# Patient Record
Sex: FEMALE | Race: WHITE | Employment: PART TIME | URBAN - METROPOLITAN AREA
[De-identification: names, ages, dates, MRNs, and addresses within clinical notes are randomized per-mention and may not be internally consistent; named-entity substitution may affect disease eponyms.]

---

## 2017-09-18 ENCOUNTER — HOSPITAL ENCOUNTER (EMERGENCY)
Age: 37
Discharge: HOME | End: 2017-09-18
Attending: EMERGENCY MEDICINE
Payer: COMMERCIAL

## 2017-09-18 VITALS
WEIGHT: 293 LBS | HEIGHT: 68 IN | TEMPERATURE: 98.4 F | BODY MASS INDEX: 44.41 KG/M2 | DIASTOLIC BLOOD PRESSURE: 88 MMHG | RESPIRATION RATE: 16 BRPM | HEART RATE: 91 BPM | OXYGEN SATURATION: 100 % | SYSTOLIC BLOOD PRESSURE: 147 MMHG

## 2017-09-18 DIAGNOSIS — I10 ESSENTIAL HYPERTENSION: ICD-10-CM

## 2017-09-18 DIAGNOSIS — R51.9 NONINTRACTABLE HEADACHE, UNSPECIFIED CHRONICITY PATTERN, UNSPECIFIED HEADACHE TYPE: Primary | ICD-10-CM

## 2017-09-18 LAB
ANION GAP SERPL CALC-SCNC: 8 MMOL/L (ref 3–18)
BASOPHILS # BLD: 0 K/UL (ref 0–0.1)
BASOPHILS NFR BLD: 0 % (ref 0–3)
BUN SERPL-MCNC: 5 MG/DL (ref 7–18)
BUN/CREAT SERPL: 7 (ref 12–20)
CALCIUM SERPL-MCNC: 8.4 MG/DL (ref 8.5–10.1)
CHLORIDE SERPL-SCNC: 100 MMOL/L (ref 100–108)
CO2 SERPL-SCNC: 29 MMOL/L (ref 21–32)
CREAT SERPL-MCNC: 0.71 MG/DL (ref 0.6–1.3)
DIFFERENTIAL METHOD BLD: ABNORMAL
EOSINOPHIL # BLD: 0 K/UL (ref 0–0.4)
EOSINOPHIL NFR BLD: 0 % (ref 0–5)
ERYTHROCYTE [DISTWIDTH] IN BLOOD BY AUTOMATED COUNT: 13.2 % (ref 11.6–14.5)
GLUCOSE SERPL-MCNC: 100 MG/DL (ref 74–99)
HCG SERPL QL: NEGATIVE
HCT VFR BLD AUTO: 37.5 % (ref 35–45)
HGB BLD-MCNC: 12.1 G/DL (ref 12–16)
LYMPHOCYTES # BLD: 1.4 K/UL (ref 0.8–3.5)
LYMPHOCYTES NFR BLD: 28 % (ref 20–51)
MCH RBC QN AUTO: 25.4 PG (ref 24–34)
MCHC RBC AUTO-ENTMCNC: 32.3 G/DL (ref 31–37)
MCV RBC AUTO: 78.6 FL (ref 74–97)
METAMYELOCYTES NFR BLD MANUAL: 1 %
MONOCYTES # BLD: 1.1 K/UL (ref 0–1)
MONOCYTES NFR BLD: 23 % (ref 2–9)
NEUTS BAND NFR BLD MANUAL: 2 % (ref 0–5)
NEUTS SEG # BLD: 2.3 K/UL (ref 1.8–8)
NEUTS SEG NFR BLD: 46 % (ref 42–75)
PLATELET # BLD AUTO: 285 K/UL (ref 135–420)
PMV BLD AUTO: 8.7 FL (ref 9.2–11.8)
POTASSIUM SERPL-SCNC: 4 MMOL/L (ref 3.5–5.5)
RBC # BLD AUTO: 4.77 M/UL (ref 4.2–5.3)
RBC MORPH BLD: ABNORMAL
SODIUM SERPL-SCNC: 137 MMOL/L (ref 136–145)
WBC # BLD AUTO: 4.9 K/UL (ref 4.6–13.2)

## 2017-09-18 PROCEDURE — 74011250636 HC RX REV CODE- 250/636: Performed by: EMERGENCY MEDICINE

## 2017-09-18 PROCEDURE — 80048 BASIC METABOLIC PNL TOTAL CA: CPT | Performed by: EMERGENCY MEDICINE

## 2017-09-18 PROCEDURE — 99284 EMERGENCY DEPT VISIT MOD MDM: CPT

## 2017-09-18 PROCEDURE — 96375 TX/PRO/DX INJ NEW DRUG ADDON: CPT

## 2017-09-18 PROCEDURE — 96374 THER/PROPH/DIAG INJ IV PUSH: CPT

## 2017-09-18 PROCEDURE — 84703 CHORIONIC GONADOTROPIN ASSAY: CPT | Performed by: EMERGENCY MEDICINE

## 2017-09-18 PROCEDURE — 74011250637 HC RX REV CODE- 250/637: Performed by: EMERGENCY MEDICINE

## 2017-09-18 PROCEDURE — 96361 HYDRATE IV INFUSION ADD-ON: CPT

## 2017-09-18 PROCEDURE — 85025 COMPLETE CBC W/AUTO DIFF WBC: CPT | Performed by: EMERGENCY MEDICINE

## 2017-09-18 PROCEDURE — 87081 CULTURE SCREEN ONLY: CPT | Performed by: EMERGENCY MEDICINE

## 2017-09-18 RX ORDER — FOLIC ACID 1 MG/1
1 TABLET ORAL DAILY
COMMUNITY

## 2017-09-18 RX ORDER — METFORMIN HYDROCHLORIDE 500 MG/1
500 TABLET ORAL 2 TIMES DAILY WITH MEALS
COMMUNITY
End: 2019-04-19

## 2017-09-18 RX ORDER — DIPHENHYDRAMINE HYDROCHLORIDE 50 MG/ML
INJECTION, SOLUTION INTRAMUSCULAR; INTRAVENOUS
Status: DISCONTINUED
Start: 2017-09-18 | End: 2017-09-18 | Stop reason: HOSPADM

## 2017-09-18 RX ORDER — OMEPRAZOLE 20 MG/1
20 CAPSULE, DELAYED RELEASE ORAL DAILY
COMMUNITY
End: 2019-04-19

## 2017-09-18 RX ORDER — METOCLOPRAMIDE HYDROCHLORIDE 5 MG/ML
INJECTION INTRAMUSCULAR; INTRAVENOUS
Status: DISCONTINUED
Start: 2017-09-18 | End: 2017-09-18 | Stop reason: HOSPADM

## 2017-09-18 RX ORDER — DEXAMETHASONE SODIUM PHOSPHATE 4 MG/ML
INJECTION, SOLUTION INTRA-ARTICULAR; INTRALESIONAL; INTRAMUSCULAR; INTRAVENOUS; SOFT TISSUE
Status: DISCONTINUED
Start: 2017-09-18 | End: 2017-09-18 | Stop reason: HOSPADM

## 2017-09-18 RX ORDER — DIPHENHYDRAMINE HYDROCHLORIDE 50 MG/ML
50 INJECTION, SOLUTION INTRAMUSCULAR; INTRAVENOUS ONCE
Status: COMPLETED | OUTPATIENT
Start: 2017-09-18 | End: 2017-09-18

## 2017-09-18 RX ORDER — DEXAMETHASONE SODIUM PHOSPHATE 4 MG/ML
10 INJECTION, SOLUTION INTRA-ARTICULAR; INTRALESIONAL; INTRAMUSCULAR; INTRAVENOUS; SOFT TISSUE
Status: COMPLETED | OUTPATIENT
Start: 2017-09-18 | End: 2017-09-18

## 2017-09-18 RX ORDER — METHYLDOPA 500 MG/1
500 TABLET, FILM COATED ORAL 2 TIMES DAILY
COMMUNITY
End: 2019-04-19

## 2017-09-18 RX ORDER — METOCLOPRAMIDE HYDROCHLORIDE 5 MG/ML
10 INJECTION INTRAMUSCULAR; INTRAVENOUS
Status: COMPLETED | OUTPATIENT
Start: 2017-09-18 | End: 2017-09-18

## 2017-09-18 RX ORDER — AMOXICILLIN 875 MG/1
875 TABLET, FILM COATED ORAL 2 TIMES DAILY
COMMUNITY
End: 2019-04-19

## 2017-09-18 RX ADMIN — SODIUM CHLORIDE 1000 ML: 900 INJECTION, SOLUTION INTRAVENOUS at 01:06

## 2017-09-18 RX ADMIN — METOCLOPRAMIDE 10 MG: 5 INJECTION, SOLUTION INTRAMUSCULAR; INTRAVENOUS at 01:13

## 2017-09-18 RX ADMIN — DIPHENHYDRAMINE HYDROCHLORIDE 50 MG: 50 INJECTION, SOLUTION INTRAMUSCULAR; INTRAVENOUS at 01:11

## 2017-09-18 RX ADMIN — DEXAMETHASONE SODIUM PHOSPHATE 10 MG: 4 INJECTION, SOLUTION INTRAMUSCULAR; INTRAVENOUS at 01:10

## 2017-09-18 NOTE — LETTER
[facility] EMERGENCY DEPT 
[id number] [place] [address] [id number] [phone] Work/School Note Date: 9/18/2017 To Whom It May concern: 
 
Jasson Devries was seen and treated today in the emergency room by the following provider(s): 
Attending Provider: Romina Paredes MD 
Physician Assistant: NAZ Temple. Jasson Devries may return to work on 9/19/2017. Sincerely, Vicky Krause RN

## 2017-09-18 NOTE — ED NOTES
I have reviewed discharge instructions with the patient. The patient verbalized understanding. Patient armband removed and shredded. Patient discharged ambulatory to [facility] with significant other.

## 2017-09-18 NOTE — DISCHARGE INSTRUCTIONS

## 2017-09-18 NOTE — ED PROVIDER NOTES
Patient is a 39 y.o. female presenting with headaches, nausea, and jaw pain. The history is provided by the patient. Headache    This is a new problem. Associated symptoms include nausea. Pertinent negatives include no fever, no weakness and no dizziness. Nausea    Associated symptoms include headaches and headaches. Pertinent negatives include no fever. Jaw Pain    Pertinent negatives include no numbness and no weakness. pt presents with c/o headache -- denies h/o migraines. HA gradual in onset and began after three days of preceding jaw/dental/sore throat pain. Denies fever, nuchal rigidity. Has pain in back of head and behind her eyes. Denies recent head trauma, changes in speech, unilateral weakness. +occasional 'sparkles' to vision. Denies FAM h/o migraines. Denies tobacco, illicits. Occasional ETOH. LNMP august after hysteroscopy.     Past Medical History:   Diagnosis Date    Adverse effect of anesthesia 06/23/2014    anesthesia awareness & tachycardia during surgery on neck lesion     Anxiety and depression     Back pain     BMI 50.0-59.9, adult (HCC)     53.4    Bronchitis     Callus of foot     Decreased exercise tolerance     mets < 4    Fibroid     GERD (gastroesophageal reflux disease)     Headache disorder     History of PCOS     History of renal cell carcinoma 2012    Hyperlipemia     Prediabetes     Sleep apnea     SOB (shortness of breath) on exertion     UTI (lower urinary tract infection)     Vertigo        Past Surgical History:   Procedure Laterality Date    HX MYOMECTOMY  2013    HX NEPHRECTOMY Right 2012    partial    HX OVARIAN CYST REMOVAL  2013    HX PREMALIG/BENIGN SKIN LESION EXCISION Right 06/23/2014    neck    HX TONSILLECTOMY      HX TONSILLECTOMY  1995         Family History:   Problem Relation Age of Onset    Thyroid Disease Mother     Diabetes Father     Cancer Paternal Grandmother      breast    Cancer Maternal Grandmother      lung    Stroke Maternal Grandmother     Stroke Maternal Aunt     Cancer Maternal Grandfather     Heart Disease Paternal Grandfather        Social History     Social History    Marital status:      Spouse name: N/A    Number of children: N/A    Years of education: N/A     Occupational History    sales      Social History Main Topics    Smoking status: Former Smoker     Packs/day: 1.50     Years: 14.00     Quit date: 7/1/2011    Smokeless tobacco: Never Used    Alcohol use Yes      Comment: social    Drug use: No    Sexual activity: Not on file     Other Topics Concern    Not on file     Social History Narrative         ALLERGIES: Aleve [naproxen sodium] and Sulfa (sulfonamide antibiotics)    Review of Systems   Constitutional: Negative for fever. HENT: Positive for dental problem, facial swelling and sore throat. Eyes: Positive for visual disturbance. Gastrointestinal: Positive for nausea. Neurological: Positive for headaches. Negative for dizziness, tremors, seizures, syncope, facial asymmetry, speech difficulty, weakness, light-headedness and numbness. All other systems reviewed and are negative. Vitals:    09/18/17 0031   BP: (!) 156/95   Pulse: 91   Resp: 16   Temp: 98.4 °F (36.9 °C)   SpO2: 98%   Weight: 155.6 kg (343 lb)   Height: 5' 8\" (1.727 m)            Physical Exam   Constitutional: She is oriented to person, place, and time. She appears well-developed. HENT:   Head: Normocephalic and atraumatic. Right Ear: External ear normal.   Left Ear: External ear normal.   Mouth/Throat: Oropharynx is clear and moist. No oropharyngeal exudate. Absent tonsils. Eyes: EOM are normal. Pupils are equal, round, and reactive to light. Neck: Normal range of motion. Neck supple. No JVD present. No tracheal deviation present. No thyromegaly present. No midline TTP. Cardiovascular: Normal rate, regular rhythm, normal heart sounds and intact distal pulses.   Exam reveals no gallop and no friction rub. No murmur heard. Pulmonary/Chest: Effort normal and breath sounds normal. No stridor. No respiratory distress. She has no wheezes. She has no rales. She exhibits no tenderness. Abdominal: Soft. She exhibits no distension and no mass. There is no tenderness. There is no rebound and no guarding. Musculoskeletal: She exhibits no edema or tenderness. Lymphadenopathy:     She has cervical adenopathy. Neurological: She is alert and oriented to person, place, and time. No cranial nerve deficit. Coordination normal.   Skin: Skin is warm and dry. No rash noted. No erythema. No pallor. Psychiatric: She has a normal mood and affect. Her behavior is normal. Thought content normal.   Nursing note and vitals reviewed. MDM  Number of Diagnoses or Management Options  Essential hypertension:   Nonintractable headache, unspecified chronicity pattern, unspecified headache type:   Diagnosis management comments: Differential: migraine; stress HA; cluster HA; sinusitis; gingivitis; dental caries/abscess; strep pharyngitis; meningitis; PTA    Reassuring exam; labs stable. Good VS.  D/c home and f/up with PCP. Amount and/or Complexity of Data Reviewed  Clinical lab tests: ordered and reviewed      ED Course       Procedures    Recent Results (from the past 12 hour(s))   CBC WITH AUTOMATED DIFF    Collection Time: 09/18/17 12:45 AM   Result Value Ref Range    WBC 4.9 4.6 - 13.2 K/uL    RBC 4.77 4.20 - 5.30 M/uL    HGB 12.1 12.0 - 16.0 g/dL    HCT 37.5 35.0 - 45.0 %    MCV 78.6 74.0 - 97.0 FL    MCH 25.4 24.0 - 34.0 PG    MCHC 32.3 31.0 - 37.0 g/dL    RDW 13.2 11.6 - 14.5 %    PLATELET 664 056 - 561 K/uL    MPV 8.7 (L) 9.2 - 11.8 FL    NEUTROPHILS 46 42 - 75 %    BAND NEUTROPHILS 2 0 - 5 %    LYMPHOCYTES 28 20 - 51 %    MONOCYTES 23 (H) 2 - 9 %    EOSINOPHILS 0 0 - 5 %    BASOPHILS 0 0 - 3 %    METAMYELOCYTES 1 (H) 0 %    ABS. NEUTROPHILS 2.3 1.8 - 8.0 K/UL    ABS.  LYMPHOCYTES 1.4 0.8 - 3.5 K/UL ABS. MONOCYTES 1.1 (H) 0 - 1.0 K/UL    ABS. EOSINOPHILS 0.0 0.0 - 0.4 K/UL    ABS. BASOPHILS 0.0 0.0 - 0.1 K/UL    RBC COMMENTS NORMOCYTIC, NORMOCHROMIC      DF MANUAL     METABOLIC PANEL, BASIC    Collection Time: 09/18/17 12:45 AM   Result Value Ref Range    Sodium 137 136 - 145 mmol/L    Potassium 4.0 3.5 - 5.5 mmol/L    Chloride 100 100 - 108 mmol/L    CO2 29 21 - 32 mmol/L    Anion gap 8 3.0 - 18 mmol/L    Glucose 100 (H) 74 - 99 mg/dL    BUN 5 (L) 7.0 - 18 MG/DL    Creatinine 0.71 0.6 - 1.3 MG/DL    BUN/Creatinine ratio 7 (L) 12 - 20      GFR est AA >60 >60 ml/min/1.73m2    GFR est non-AA >60 >60 ml/min/1.73m2    Calcium 8.4 (L) 8.5 - 10.1 MG/DL   HCG QL SERUM    Collection Time: 09/18/17 12:45 AM   Result Value Ref Range    HCG, Ql. NEGATIVE  NEG     STREP THROAT SCREEN    Collection Time: 09/18/17  1:00 AM   Result Value Ref Range    Special Requests: NO SPECIAL REQUESTS      Strep Screen NEGATIVE       Culture result: PENDING      1:58 AM  Diagnosis:   1. Nonintractable headache, unspecified chronicity pattern, unspecified headache type    2. Essential hypertension          Disposition: home    Follow-up Information     Follow up With Details Comments Contact Info    Roni Cockayne, MD Schedule an appointment as soon as possible for a visit today  [address] [place] [address] Road  [address] [facility] EMERGENCY DEPT  If symptoms worsen return immediately [address]  [phone]          Patient's Medications   Start Taking    No medications on file   Continue Taking    AMOXICILLIN (AMOXIL) 875 MG TABLET    Take 875 mg by mouth two (2) times a day. CHOLECALCIFEROL (VITAMIN D3) 1,000 UNIT TABLET    Take 1,000 Units by mouth daily. ERGOCALCIFEROL (ERGOCALCIFEROL) 50,000 UNIT CAPSULE    Take 50,000 Units by mouth. Twice weekly    FOLIC ACID (FOLVITE) 1 MG TABLET    Take 1 mg by mouth daily.     FUROSEMIDE (LASIX) 20 MG TABLET    Take 20 mg by mouth daily. METFORMIN (GLUCOPHAGE) 500 MG TABLET    Take 500 mg by mouth two (2) times daily (with meals). METHYLDOPA (ALDOMET) 500 MG TABLET    Take 500 mg by mouth two (2) times a day. OMEPRAZOLE (PRILOSEC) 20 MG CAPSULE    Take 20 mg by mouth daily. OXYCODONE-ACETAMINOPHEN (PERCOCET) 5-325 MG PER TABLET    Take 2 Tabs by mouth every four (4) hours as needed for Pain. Max Daily Amount: 12 Tabs. These Medications have changed    No medications on file   Stop Taking    DIAZEPAM (VALIUM) 5 MG TABLET    Take 5 mg by mouth once. DOXYCYCLINE (MONODOX) 100 MG CAPSULE    Take 100 mg by mouth two (2) times a day. LISINOPRIL (PRINIVIL, ZESTRIL) 10 MG TABLET    Take 10 mg by mouth daily. NORGESTREL-ETHINYL ESTRADIOL (LO/OVRAL) 0.3-30 MG-MCG TAB    Take  by mouth daily. Indications: ENDOMETRIOSIS    RANITIDINE (ZANTAC) 150 MG TABLET    Take 150 mg by mouth two (2) times a day.

## 2017-09-20 LAB
B-HEM STREP THROAT QL CULT: NEGATIVE
BACTERIA SPEC CULT: NORMAL
SERVICE CMNT-IMP: NORMAL

## 2017-10-09 ENCOUNTER — HOSPITAL ENCOUNTER (OUTPATIENT)
Dept: LAB | Age: 37
Discharge: HOME | End: 2017-10-09

## 2017-10-09 LAB — SENTARA SPECIMEN COL,SENBCF: NORMAL

## 2017-10-09 PROCEDURE — 99001 SPECIMEN HANDLING PT-LAB: CPT | Performed by: FAMILY MEDICINE

## 2019-03-25 PROBLEM — O21.1 SEVERE HYPEREMESIS GRAVIDARUM: Status: ACTIVE | Noted: 2019-03-25

## 2019-04-17 PROBLEM — O21.0 HYPEREMESIS AFFECTING PREGNANCY, ANTEPARTUM: Status: ACTIVE | Noted: 2019-04-17

## 2022-03-18 PROBLEM — O21.1 SEVERE HYPEREMESIS GRAVIDARUM: Status: ACTIVE | Noted: 2019-03-25

## 2022-03-19 PROBLEM — O21.0 HYPEREMESIS AFFECTING PREGNANCY, ANTEPARTUM: Status: ACTIVE | Noted: 2019-04-17

## 2023-05-31 ENCOUNTER — HOSPITAL ENCOUNTER (OUTPATIENT)
Facility: HOSPITAL | Age: 43
Setting detail: RECURRING SERIES
Discharge: HOME | End: 2023-06-03
Payer: MEDICAID

## 2023-05-31 PROCEDURE — 97162 PT EVAL MOD COMPLEX 30 MIN: CPT

## 2023-05-31 NOTE — PROGRESS NOTES
PT DAILY TREATMENT NOTE/LUMBAR EVAL     Patient Name: Mabel Boogie    Date: 2023    : 1980  Insurance: Payor: Ld Hinojosa / Plan: [employer] PLUS / Product Type: *No Product type* /      Patient  verified yes     Visit #   Current / Total 1 10   Time   In / Out 230 310   Pain   In / Out 5 5   Subjective Functional Status/Changes: See below   Changes to:  Meds, Allergies, Med Hx, Sx Hx? If yes, update Summary List no       Treatment Area: Other low back pain [M54.59]  SUBJECTIVE     Mechanism of Injury: No known    Current symptoms/Complaints: L4-5 Disc Injury. Lower back pain radiates as far as to the top of her left foot. Difficulty with bending and lifting. Occasional hip flexion difficult and having to use Ue's to lift LLE. Symptoms began about 2.5 years ago. First set of injection decreased pain for about a year. 2nd set of injections this year exacerbated her pain. She is unable to get epidural injection until she does 4 weeks of PT. Reports numbness in foot as well. Denies any history of PT. Previous Treatment/Compliance: no history of PT  PMHx/Surgical Hx: HTN, history of cancer, partial nephrectomy with 6 months of being bedridden, weight loss surgeries PCOS, endometriosis, previous , Hysterectomy, Total of 4 abdominal open surgeries.      Work Hx: unemployed  Pt Goals: \"I just want my injection\"     Diagnostic Tests: [] Lab work [] X-rays    [] CT [x] MRI     [] Other:  Results: \"arthritis and cyst at L4-L5\"    Symptoms:  Aggravated by:   [x] Bending [x] Sitting [] Standing [] Walking   [] Moving [] Cough [] Sneeze [] Valsalva   [] AM  [] PM  Lying:  [] sup   [] pro   [] sidelying   [] Other:     Eased by: none        40 min [x]Eval        - untimed          [x]  Patient Education billed concurrently with other procedures     Other Objective/Functional Measures:     Posture:  Lateral Shift: [] R    [] L     [] +  [] -  Kyphosis: [] Increased []

## 2023-05-31 NOTE — PROGRESS NOTES
[facility] PHYSICAL THERAPY  [address] [place] [address]. [facility] [[email or web address]] [address] [facility] Phone: [phone] Fax   Plan of Care / Statement of Necessity for Physical Therapy Services     Patient Name: Rancho Greer : 1980   Medical   Diagnosis: Other low back pain [M54.59] Treatment Diagnosis:     M54.59  OTHER LOWER BACK PAIN     Onset Date: 2.5 years ago Payor:  Payor: Tay Vargas / Plan: Aminata Murray / Product Type: *No Product type* /    Referral Source: MANJINDER Chambers [facility]): 2023   Prior Hospitalization: See medical history Provider #: [id number]   Prior Level of Function: Independent, Unemployed   Comorbidities: HTN, History of Cancer, Polycystic ovary syndrome, partial nephrectomy   Medications: Verified on Patient Summary List     Assessment / key information:  Pt is a 43year old female who presents to PT today low back pain with radiating pain in to LLE. The resulting condition has affected their ability to bend, lift and engage with her 1year old. Patient with a Functional Status score of 44 on FOTO (Focused on Therapeutic Outcomes), which corresponds to a functional limitation of 56%. Patient will benefit from skilled PT services to address these issues. Pt was educated regarding their diagnosis and prognosis. Thank you for this referral.     Evaluation Complexity:  History:  HIGH Complexity :3+ comorbidities / personal factors will impact the outcome/ POC ; Examination:  MEDIUM Complexity : 3 Standardized tests and measures addressin body structure, function, activity limitation and / or participation in recreation  ;Presentation:  MEDIUM Complexity : Evolving with changing characteristics  ; Clinical Decision Making:  MEDIUM Complexity : FOTO score of 26-74 FOTO score = an established functional score where 100 = no disability  Overall Complexity Rating: MEDIUM  Problem List: pain affecting function,

## 2023-06-14 ENCOUNTER — HOSPITAL ENCOUNTER (OUTPATIENT)
Facility: HOSPITAL | Age: 43
Setting detail: RECURRING SERIES
End: 2023-06-14
Payer: MEDICAID

## 2023-06-20 ENCOUNTER — HOSPITAL ENCOUNTER (OUTPATIENT)
Facility: HOSPITAL | Age: 43
Setting detail: RECURRING SERIES
Discharge: HOME | End: 2023-06-23
Payer: MEDICAID

## 2023-06-20 PROCEDURE — 97530 THERAPEUTIC ACTIVITIES: CPT

## 2023-06-20 PROCEDURE — 97110 THERAPEUTIC EXERCISES: CPT

## 2023-06-20 PROCEDURE — 97112 NEUROMUSCULAR REEDUCATION: CPT

## 2023-06-20 NOTE — PROGRESS NOTES
PHYSICAL THERAPY - DAILY TREATMENT NOTE (updated )    Patient Name: Terry Mueller    Date: 2023    : 1980  Insurance: Payor: Harish Valera / Plan: [facility] [employer]US / Product Type: *No Product type* /      Patient  verified yes     Visit #   Current / Total 3 10   Time   In / Out 9:15 10:11   Pain   In / Out 7/10 4/10   Subjective Functional Status/Changes: Pt notes she was completing her home exercise program yesterday when her toddler jumped on her lower back. Since then, she has been experiencing increased pain. Changes to:  Meds, Allergies, Med Hx, Sx Hx? If yes, update Summary List no       TREATMENT AREA =  Other low back pain [M54.59]    OBJECTIVE    +  L SLR Test    Modalities Rationale:     decrease pain to improve patient's ability to progress to PLOF and address remaining functional goals. min [] Estim Unattended, type/location:                                      []  w/ice    []  w/heat    min [] Estim Attended, type/location:                                     []  w/US     []  w/ice    []  w/heat    []  TENS insruct      min []  Mechanical Traction: type/lbs                   []  pro   []  sup   []  int   []  cont    []  before manual    []  after manual    min []  Ultrasound, settings/location:      min []  Iontophoresis w/ dexamethasone, location:                                               []  take home patch       []  in clinic   10 min  unbill []  Ice     [x]  Heat    location/position: Prone ext. on wedges    min []  Paraffin,  details:     min []  Vasopneumatic Device, press/temp:     min []  Giovani Back / Rebecca Conradi:     If using vaso (only need to measure limb vaso being performed on)      pre-treatment girth :       post-treatment girth :       measured at (landmark location) :      min []  Other:    Skin assessment post-treatment (if applicable):    [x]  intact    []  redness- no adverse reaction                 []redness - adverse reaction:

## 2023-06-21 ENCOUNTER — HOSPITAL ENCOUNTER (OUTPATIENT)
Facility: HOSPITAL | Age: 43
Setting detail: RECURRING SERIES
Discharge: HOME | End: 2023-06-24
Payer: MEDICAID

## 2023-06-21 PROCEDURE — 97530 THERAPEUTIC ACTIVITIES: CPT

## 2023-06-21 PROCEDURE — 97112 NEUROMUSCULAR REEDUCATION: CPT

## 2023-06-21 PROCEDURE — 97140 MANUAL THERAPY 1/> REGIONS: CPT

## 2023-06-21 NOTE — PROGRESS NOTES
PHYSICAL / OCCUPATIONAL THERAPY - DAILY TREATMENT NOTE (updated )    Patient Name: Rickey Deluca    Date: 2023    : 1980  Insurance: Payor: Sari Garvey / Plan: Zack Mccray PLUS / Product Type: *No Product type* /      Patient  verified yes     Visit #   Current / Total 4 10   Time   In / Out 11:20 12   Pain   In / Out 7 4   Subjective Functional Status/Changes: Pt reports she sometimes gets pain at her abdomen and has constipation often in the mornings. She denies any incontinence, but does have PCOS, endo and had a part of her kidney removed last year. She feels like she's getting worse, not better. The only exercise that helps her is the prone prop. TREATMENT AREA =  Other low back pain [M54.59]    OBJECTIVE        Therapeutic Procedures: Tx Min Billable or 1:1 Min (if diff from Tx Min) Procedure, Rationale, Specifics   10  09564 Therapeutic Activity (timed):  use of dynamic activities replicating functional movements to increase ROM, strength, coordination, balance, and proprioception in order to improve patient's ability to progress to PLOF and address remaining functional goals. (see flow sheet as applicable)     Details if applicable:  abdominal assessment and hx for pelvic symptoms     15  04767 Manual Therapy (timed):  decrease pain and increase tissue extensibility to improve patient's ability to progress to PLOF and address remaining functional goals. The manual therapy interventions were performed at a separate and distinct time from the therapeutic activities interventions .  (see flow sheet as applicable)     Details if applicable:  visceral mobilization bladder, uterus, rectum, right kidney, ureter and urachus, myofascial release triplanar over left lower quadrant abdomen and low back    10  84670 Neuromuscular Re-Education (timed):  improve balance, coordination, kinesthetic sense, posture, core stability and proprioception to improve patient's ability

## 2023-06-28 ENCOUNTER — APPOINTMENT (OUTPATIENT)
Facility: HOSPITAL | Age: 43
End: 2023-06-28
Payer: MEDICAID

## 2023-06-29 ENCOUNTER — APPOINTMENT (OUTPATIENT)
Facility: HOSPITAL | Age: 43
End: 2023-06-29
Payer: MEDICAID

## 2023-07-05 ENCOUNTER — TELEPHONE (OUTPATIENT)
Facility: HOSPITAL | Age: 43
End: 2023-07-05

## 2023-07-11 ENCOUNTER — TELEPHONE (OUTPATIENT)
Facility: HOSPITAL | Age: 43
End: 2023-07-11